# Patient Record
Sex: FEMALE | Race: WHITE | NOT HISPANIC OR LATINO
[De-identification: names, ages, dates, MRNs, and addresses within clinical notes are randomized per-mention and may not be internally consistent; named-entity substitution may affect disease eponyms.]

---

## 2018-09-20 ENCOUNTER — TRANSCRIBE ORDERS (OUTPATIENT)
Dept: SCHEDULING | Age: 63
End: 2018-09-20

## 2018-09-20 DIAGNOSIS — M79.643 PAIN OF HAND: Primary | ICD-10-CM

## 2018-09-20 DIAGNOSIS — M05.9 RHEUMATOID ARTHRITIS WITH RHEUMATOID FACTOR (CMS/HCC): ICD-10-CM

## 2018-09-20 DIAGNOSIS — Z79.899 OTHER LONG TERM (CURRENT) DRUG THERAPY: ICD-10-CM

## 2018-09-20 DIAGNOSIS — M85.80 OTHER SPECIFIED DISORDERS OF BONE DENSITY AND STRUCTURE, UNSPECIFIED SITE (CODE): ICD-10-CM

## 2018-09-20 DIAGNOSIS — Z13.89 ENCOUNTER FOR SCREENING FOR OTHER DISORDER: ICD-10-CM

## 2018-09-24 ENCOUNTER — TELEPHONE (OUTPATIENT)
Dept: PRIMARY CARE | Facility: CLINIC | Age: 63
End: 2018-09-24

## 2018-09-24 ENCOUNTER — HOSPITAL ENCOUNTER (OUTPATIENT)
Dept: RADIOLOGY | Age: 63
Discharge: HOME | End: 2018-09-24
Attending: INTERNAL MEDICINE
Payer: COMMERCIAL

## 2018-09-24 DIAGNOSIS — M79.643 PAIN OF HAND: ICD-10-CM

## 2018-09-24 DIAGNOSIS — Z13.89 ENCOUNTER FOR SCREENING FOR OTHER DISORDER: ICD-10-CM

## 2018-09-24 DIAGNOSIS — M85.80 OTHER SPECIFIED DISORDERS OF BONE DENSITY AND STRUCTURE, UNSPECIFIED SITE (CODE): ICD-10-CM

## 2018-09-24 DIAGNOSIS — Z79.899 OTHER LONG TERM (CURRENT) DRUG THERAPY: ICD-10-CM

## 2018-09-24 DIAGNOSIS — M05.9 RHEUMATOID ARTHRITIS WITH RHEUMATOID FACTOR (CMS/HCC): ICD-10-CM

## 2018-09-24 PROCEDURE — 77080 DXA BONE DENSITY AXIAL: CPT

## 2018-09-24 NOTE — TELEPHONE ENCOUNTER
Referral submitted in navinet for dexa scan [id number] at [facility]. Ref# [id number]  Expires 9/24/19

## 2018-11-11 ENCOUNTER — APPOINTMENT (OUTPATIENT)
Dept: RADIOLOGY | Age: 63
End: 2018-11-11
Attending: INTERNAL MEDICINE
Payer: COMMERCIAL

## 2018-11-11 ENCOUNTER — HOSPITAL ENCOUNTER (OUTPATIENT)
Facility: CLINIC | Age: 63
Discharge: HOME | End: 2018-11-11
Attending: INTERNAL MEDICINE
Payer: COMMERCIAL

## 2018-11-11 VITALS
OXYGEN SATURATION: 98 % | SYSTOLIC BLOOD PRESSURE: 116 MMHG | TEMPERATURE: 98.3 F | DIASTOLIC BLOOD PRESSURE: 74 MMHG | WEIGHT: 130 LBS | HEIGHT: 64 IN | BODY MASS INDEX: 22.2 KG/M2 | RESPIRATION RATE: 18 BRPM | HEART RATE: 81 BPM

## 2018-11-11 DIAGNOSIS — J45.40 ASTHMATIC BRONCHITIS, MODERATE PERSISTENT, UNCOMPLICATED: Primary | ICD-10-CM

## 2018-11-11 PROCEDURE — S9083 URGENT CARE CENTER GLOBAL: HCPCS | Performed by: INTERNAL MEDICINE

## 2018-11-11 PROCEDURE — 71046 X-RAY EXAM CHEST 2 VIEWS: CPT | Performed by: INTERNAL MEDICINE

## 2018-11-11 PROCEDURE — 99213 OFFICE O/P EST LOW 20 MIN: CPT | Performed by: INTERNAL MEDICINE

## 2018-11-11 RX ORDER — CEFUROXIME AXETIL 500 MG/1
500 TABLET ORAL
COMMUNITY
Start: 2018-02-05 | End: 2019-03-28

## 2018-11-11 RX ORDER — FOLIC ACID 1 MG/1
1 TABLET ORAL
COMMUNITY
Start: 2012-02-01

## 2018-11-11 RX ORDER — AZITHROMYCIN 250 MG/1
250 TABLET, FILM COATED ORAL DAILY
Qty: 6 TABLET | Refills: 0 | Status: SHIPPED | OUTPATIENT
Start: 2018-11-11 | End: 2019-03-28

## 2018-11-11 RX ORDER — PREDNISONE 20 MG/1
20 TABLET ORAL 2 TIMES DAILY
Qty: 10 TABLET | Refills: 0 | Status: SHIPPED | OUTPATIENT
Start: 2018-11-11 | End: 2019-03-28

## 2018-11-11 RX ORDER — ALBUTEROL SULFATE 0.83 MG/ML
2.5 SOLUTION RESPIRATORY (INHALATION) EVERY 6 HOURS PRN
Status: DISCONTINUED | OUTPATIENT
Start: 2018-11-11 | End: 2018-11-11 | Stop reason: HOSPADM

## 2018-11-11 RX ORDER — PREDNISONE 5 MG/1
20 TABLET ORAL ONCE
Status: COMPLETED | OUTPATIENT
Start: 2018-11-11 | End: 2018-11-11

## 2018-11-11 RX ORDER — MAGNESIUM 250 MG
250 TABLET ORAL
COMMUNITY
Start: 2012-02-01

## 2018-11-11 RX ORDER — PSYLLIUM HUSK 0.4 G
CAPSULE ORAL
COMMUNITY
End: 2019-03-28

## 2018-11-11 RX ADMIN — ALBUTEROL SULFATE 2.5 MG: 0.83 SOLUTION RESPIRATORY (INHALATION) at 11:38

## 2018-11-11 RX ADMIN — PREDNISONE 20 MG: 5 TABLET ORAL at 11:44

## 2018-11-11 ASSESSMENT — ENCOUNTER SYMPTOMS
SHORTNESS OF BREATH: 0
COUGH: 1
WHEEZING: 1
DIARRHEA: 0
FEVER: 0
VOMITING: 0
HEADACHES: 1
RHINORRHEA: 0
BACK PAIN: 0
NAUSEA: 0
SORE THROAT: 0
SINUS CONGESTION: 0
CHILLS: 0

## 2018-11-11 NOTE — ED PROVIDER NOTES
History  Chief Complaint   Patient presents with   • Cough     12 days      NKDA          History provided by:  Patient   used: No    Cough   Cough characteristics:  Productive  Sputum characteristics:  Green and white  Onset quality:  Sudden  Duration:  12 days  Chronicity:  New  Smoker: no    Relieved by:  Cough suppressants  Associated symptoms: headaches and wheezing    Associated symptoms: no chills, no ear pain, no fever, no rash, no rhinorrhea, no shortness of breath, no sinus congestion and no sore throat    Risk factors: recent travel        No past medical history on file.    No past surgical history on file.    No family history on file.    Social History   Substance Use Topics   • Smoking status: Not on file   • Smokeless tobacco: Not on file   • Alcohol use Not on file       Review of Systems   Constitutional: Negative for chills and fever.   HENT: Negative for ear pain, rhinorrhea and sore throat.    Respiratory: Positive for cough and wheezing. Negative for shortness of breath.    Cardiovascular: Negative for leg swelling.   Gastrointestinal: Negative for diarrhea, nausea and vomiting.   Musculoskeletal: Negative for back pain.   Skin: Negative for rash.   Neurological: Positive for headaches.       Physical Exam  ED Triage Vitals [11/11/18 1113]   Temp Heart Rate Resp BP SpO2   36.8 °C (98.3 °F) 81 18 116/74 98 %      Temp src Heart Rate Source Patient Position BP Location FiO2 (%) (Set)   -- -- Sitting Left upper arm --       Physical Exam   Constitutional: She is oriented to person, place, and time. She appears well-nourished. No distress.   cough   HENT:   Mouth/Throat: Oropharynx is clear and moist. No oropharyngeal exudate.   Eyes: Right eye exhibits no discharge. Left eye exhibits no discharge.   Cardiovascular: Normal rate and regular rhythm.    Pulmonary/Chest: She has wheezes.   Musculoskeletal: She exhibits no edema.   Lymphadenopathy:     She has no cervical adenopathy.    Neurological: She is alert and oriented to person, place, and time.   Skin: Skin is warm and dry. Capillary refill takes less than 2 seconds. She is not diaphoretic.   Psychiatric: She has a normal mood and affect. Her behavior is normal.   Vitals reviewed.        Procedures  Procedures    UC Course  Clinical Impressions as of Nov 11 1233   Asthmatic bronchitis, moderate persistent, uncomplicated       MDM  Number of Diagnoses or Management Options  Asthmatic bronchitis, moderate persistent, uncomplicated:   Diagnosis management comments: Chest x-ray was done which was negative today.  Significant reactive airways on exam.  Patient was treated with bronchodilators and prednisone.  Patient does have some immune suppression from biological is related to her rheumatoid arthritis.  Cover with antibiotics.  Continue 5 days of prednisone along with as needed bronchodilators in the form of pro-air.  Patient to follow-up with her primary care doctor if not significantly improving this week                 Marcio Moss DO  11/11/18 1236

## 2018-11-11 NOTE — DISCHARGE INSTRUCTIONS
In the office you were giving 2 albuterol breathing treatments which did result in good improvement of your airways.  You also start on prednisone and given a 40 mg dose.  Please start the prednisone prescription tonight and continue twice daily until finished.  Please use the pro-air recipe click inhaler 2 inhalations every 4-6 hours as demonstrated and needed.  Please remember, this will take 10-15 minutes to exert an effect.  Please begin the azithromycin today as well.  Lastly, some plain generic Mucinex the [facility] brand is fine, may be helpful as well to break up the mucus.  Take with a full glass of water.  Follow-up with your primary care physician later this week if symptoms are not significantly improving and on the way to resolution.

## 2019-03-28 ENCOUNTER — OFFICE VISIT (OUTPATIENT)
Dept: PRIMARY CARE | Facility: CLINIC | Age: 64
End: 2019-03-28
Payer: COMMERCIAL

## 2019-03-28 VITALS
SYSTOLIC BLOOD PRESSURE: 119 MMHG | RESPIRATION RATE: 14 BRPM | WEIGHT: 137.8 LBS | OXYGEN SATURATION: 98 % | HEART RATE: 87 BPM | BODY MASS INDEX: 23.52 KG/M2 | TEMPERATURE: 98.5 F | HEIGHT: 64 IN | DIASTOLIC BLOOD PRESSURE: 68 MMHG
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
## 2019-03-28 DIAGNOSIS — H00.015 HORDEOLUM EXTERNUM OF LEFT LOWER EYELID: Primary | ICD-10-CM

## 2019-03-28 DIAGNOSIS — G47.33 OSA (OBSTRUCTIVE SLEEP APNEA): ICD-10-CM

## 2019-03-28 PROBLEM — C44.91 BASAL CELL ADENOCARCINOMA: Status: ACTIVE | Noted: 2019-03-28

## 2019-03-28 PROCEDURE — 99214 OFFICE O/P EST MOD 30 MIN: CPT | Performed by: INTERNAL MEDICINE

## 2019-03-28 RX ORDER — TOBRAMYCIN AND DEXAMETHASONE 3; 1 MG/ML; MG/ML
SUSPENSION/ DROPS OPHTHALMIC
Qty: 5 ML | Refills: 1 | Status: SHIPPED | OUTPATIENT
Start: 2019-03-28

## 2019-03-28 ASSESSMENT — ENCOUNTER SYMPTOMS
SORE THROAT: 0
DYSPHORIC MOOD: 0
DECREASED CONCENTRATION: 0
HEADACHES: 0
LIGHT-HEADEDNESS: 0
SHORTNESS OF BREATH: 0
FEVER: 0
SINUS PRESSURE: 0
FATIGUE: 0
NERVOUS/ANXIOUS: 0
WEAKNESS: 0

## 2019-03-28 NOTE — PATIENT INSTRUCTIONS
Use eye drops for stye. One drop 3 x a day for 5 days, warm compresses.     The are on the next is an insect bite. Use steroid cream.   To have sleep study.

## 2019-03-28 NOTE — PROGRESS NOTES
Subjective      Patient ID: Allison Hilario is a 63 y.o. female.    HPI  Uses mouth guard for snoring, made by dentist, At dentist recently who suggested pt get sleep study.  Pt thinks mouth piece is helping to reduce snoring. Has some daytime fatigue. Mother and sister with LYN. .     Has a red area at lower eyelid. Mildly tender , no discharge.   Had teeth cleaning 8 days ago, noted lump 4days ago at left side of neck. Pt concerned is a lymph node.is mildly itchy. . No pain in mouth.   No sore throat.     +RA, under control. On MTX and Embrel. Water aerobics really help.   +Hx of breast cancer, rt Sept 2003, lumpectomy, RT, tamoxifen, Arimidex.   +Dyshydrotic eczema. Saw derm today. No abn lesion.     The following have been reviewed and updated as appropriate in this visit:       Review of Systems   Constitutional: Negative for fatigue and fever.   HENT: Negative for congestion, sinus pressure and sore throat.    Eyes: Negative for visual disturbance.        See hpi   Respiratory: Negative for shortness of breath.    Cardiovascular: Negative for chest pain.   Skin: Positive for rash.   Allergic/Immunologic: Positive for immunocompromised state.   Neurological: Negative for weakness, light-headedness and headaches.   Psychiatric/Behavioral: Negative for decreased concentration and dysphoric mood. The patient is not nervous/anxious.      Current Outpatient Prescriptions   Medication Sig Dispense Refill   • calcium carbonate-vitamin D3 (CALTRATE 600 + D) 600 mg (1,500 mg)-800 unit tablet,chewable one daily     • etanercept (ENBREL) 25 mg/0.5mL (0.51) injection Inject 25 mg under the skin.     • folic acid (FOLVITE) 1 mg tablet 1 mg.     • magnesium 250 mg tablet 250 mg.     • methotrexate sodium (RHEUMATREX) 2.5 mg tablets,dose pack Take 7.5 mg by mouth once a week.      • tobramycin-dexamethasone (TOBRADEX) ophthalmic solution One drop to left eye 3 x a day for 5 days 5 mL 1     No current facility-administered  medications for this visit.      Past Medical History:   Diagnosis Date   • Arthritis    • Breast cancer (CMS/HCC) (HCC)      Family History   Problem Relation Age of Onset   • Brain cancer Mother    • Hypertension Mother    • Heart failure Father    • Diabetes Father    • Breast cancer Sister    • Melanoma Brother      Allergies   Allergen Reactions   • Penicillins Other (see comments)     Unknown      Past Surgical History:   Procedure Laterality Date   • MASTECTOMY Right      Social History     Social History   • Marital status:      Spouse name: N/A   • Number of children: N/A   • Years of education: N/A     Occupational History   • Not on file.     Social History Main Topics   • Smoking status: Never Smoker   • Smokeless tobacco: Never Used   • Alcohol use No   • Drug use: No   • Sexual activity: Not on file     Other Topics Concern   • Not on file     Social History Narrative   • No narrative on file       Objective     Physical Exam   Constitutional: She appears well-developed and well-nourished. No distress.   HENT:   Cannot visualize distal tip of uvula   Eyes:   Erythematous hordeolum  at left lower inner eyelid    Cardiovascular: Normal rate and regular rhythm.    Pulmonary/Chest: Effort normal and breath sounds normal.   Lymphadenopathy:        Head (right side): No submental and no submandibular adenopathy present.        Head (left side): No submental and no submandibular adenopathy present.     She has no cervical adenopathy.        Right cervical: No superficial cervical, no deep cervical and no posterior cervical adenopathy present.       Left cervical: No superficial cervical, no deep cervical and no posterior cervical adenopathy present.        Right: No supraclavicular adenopathy present.        Left: No supraclavicular adenopathy present.   Skin:   2 cm superficial, epidermal, erythematous lesion at left neck.  Consistent with insect bite.  No adenopathy noted.   Psychiatric: She has a  normal mood and affect.   Nursing note and vitals reviewed.      Assessment/Plan   Problem List Items Addressed This Visit     None      Visit Diagnoses     Hordeolum externum of left lower eyelid    -  Primary    Relevant Medications    tobramycin-dexamethasone (TOBRADEX) ophthalmic solution    Insect bite, initial encounter        LYN (obstructive sleep apnea)        Relevant Orders    Ambulatory referral to Sleep Medicine      Above discussed.  TobraDex ophthalmic solution to left eye.  Insect bite at left neck, no lymph nodes are seen.  Use over-the-counter topical steroids for this.  Likely has obstructive sleep apnea, sent for sleep study.    Moe Lopez MD  3/28/2019

## 2019-04-05 ENCOUNTER — TRANSCRIBE ORDERS (OUTPATIENT)
Dept: SCHEDULING | Age: 64
End: 2019-04-05

## 2019-04-05 DIAGNOSIS — G47.33 OBSTRUCTIVE SLEEP APNEA: Primary | ICD-10-CM

## 2019-05-02 ENCOUNTER — HOSPITAL ENCOUNTER (OUTPATIENT)
Dept: SLEEP MEDICINE | Facility: HOSPITAL | Age: 64
Discharge: HOME | End: 2019-05-02
Attending: INTERNAL MEDICINE
Payer: COMMERCIAL

## 2019-05-02 DIAGNOSIS — G47.33 OBSTRUCTIVE SLEEP APNEA: ICD-10-CM

## 2019-05-02 PROCEDURE — G0399 HOME SLEEP TEST/TYPE 3 PORTA: HCPCS

## 2019-05-13 ENCOUNTER — OFFICE VISIT (OUTPATIENT)
Dept: PRIMARY CARE | Facility: CLINIC | Age: 64
End: 2019-05-13
Payer: COMMERCIAL

## 2019-05-13 VITALS
WEIGHT: 138.4 LBS | OXYGEN SATURATION: 97 % | DIASTOLIC BLOOD PRESSURE: 68 MMHG | TEMPERATURE: 97.7 F | HEART RATE: 101 BPM | BODY MASS INDEX: 23.63 KG/M2 | SYSTOLIC BLOOD PRESSURE: 140 MMHG | HEIGHT: 64 IN | RESPIRATION RATE: 14 BRPM

## 2019-05-13 DIAGNOSIS — R10.2 PELVIC PRESSURE IN FEMALE: Primary | ICD-10-CM

## 2019-05-13 LAB
BILIRUBIN, POC: NEGATIVE
BLOOD URINE, POC: NEGATIVE
CLARITY, POC: CLEAR
COLOR, POC: YELLOW
GLUCOSE URINE, POC: NEGATIVE
KETONES, POC: NEGATIVE
LEUKOCYTE EST, POC: NEGATIVE
NITRITE, POC: NEGATIVE
PH, POC: 6
PROTEIN, POC: NEGATIVE
SPECIFIC GRAVITY, POC: 1.01
UROBILINOGEN, POC: 0.2

## 2019-05-13 PROCEDURE — 99213 OFFICE O/P EST LOW 20 MIN: CPT | Performed by: INTERNAL MEDICINE

## 2019-05-13 PROCEDURE — 81002 URINALYSIS NONAUTO W/O SCOPE: CPT | Performed by: INTERNAL MEDICINE

## 2019-05-13 ASSESSMENT — ENCOUNTER SYMPTOMS
CONSTIPATION: 0
DIARRHEA: 0
FATIGUE: 0

## 2019-05-13 NOTE — PROGRESS NOTES
Subjective      Patient ID: Allison Hilario is a 64 y.o. female.    HPI  Pressure feeling in pelvis for a few weeks.   No dysuria.   +frequency, is drnking lots of liquids.   BMs fine.  Last gyn visit 2 years ago, normal.   Caffiene, tea, 2 bottles a day     Hx breast cancer.   RA on MTX and Enbrel. .     The following have been reviewed and updated as appropriate in this visit:  Allergies  Meds  Problems       Review of Systems   Constitutional: Negative for fatigue.   Gastrointestinal: Negative for constipation and diarrhea.   Genitourinary:        See hpi     Current Outpatient Prescriptions   Medication Sig Dispense Refill   • calcium carbonate-vitamin D3 (CALTRATE 600 + D) 600 mg (1,500 mg)-800 unit tablet,chewable one daily     • etanercept (ENBREL) 25 mg/0.5mL (0.51) injection Inject 25 mg under the skin.     • folic acid (FOLVITE) 1 mg tablet 1 mg.     • magnesium 250 mg tablet 250 mg.     • methotrexate sodium (RHEUMATREX) 2.5 mg tablets,dose pack Take 7.5 mg by mouth once a week.      • tobramycin-dexamethasone (TOBRADEX) ophthalmic solution One drop to left eye 3 x a day for 5 days 5 mL 1     No current facility-administered medications for this visit.      Past Medical History:   Diagnosis Date   • Arthritis    • Breast cancer (CMS/HCC) (HCC)      Family History   Problem Relation Age of Onset   • Brain cancer Mother    • Hypertension Mother    • Heart failure Father    • Diabetes Father    • Breast cancer Sister    • Melanoma Brother      Allergies   Allergen Reactions   • Penicillins Other (see comments)     Unknown      Past Surgical History:   Procedure Laterality Date   • MASTECTOMY Right      Social History     Social History   • Marital status:      Spouse name: N/A   • Number of children: N/A   • Years of education: N/A     Social History Main Topics   • Smoking status: Never Smoker   • Smokeless tobacco: Never Used   • Alcohol use No   • Drug use: No   • Sexual activity: Not Asked  "    Other Topics Concern   • None     Social History Narrative   • None       Objective     Vitals:   Vitals:    05/13/19 1312   BP: 140/68   BP Location: Left upper arm   Patient Position: Sitting   Pulse: (!) 101   Resp: 14   Temp: 36.5 °C (97.7 °F)   SpO2: 97%   Weight: 62.8 kg (138 lb 6.4 oz)   Height: 1.626 m (5' 4\")       Physical Exam   Abdominal: Soft. Bowel sounds are normal. She exhibits no distension. There is no tenderness.   Genitourinary:   Genitourinary Comments: No cva tenderness. Mild suprapubic tenderness   Nursing note and vitals reviewed.      Labs  No results found for: WBC, HGB, HCT, PLT, CHOL, TRIG, HDL, LDLCALC, ALT, AST, NA, K, GLU, CL, CREATININE, BUN, CO2, TSH    Assessment/Plan   Problem List Items Addressed This Visit     None      Visit Diagnoses     Pelvic pressure in female    -  Primary    Relevant Orders    US PELVIS TRANSABDOMINAL & TRANSVAGINAL    POCT urinalysis dipstick (Completed)        Pelvic pressure with some urinary issues for the past 2 weeks, urine dip is negative here.  Check ultrasound of pelvis.  Advised to return to normal liquid consumption and cut down on caffeine,.  Moe Lopez MD  5/13/2019    "

## 2019-05-14 ENCOUNTER — TELEPHONE (OUTPATIENT)
Dept: PRIMARY CARE | Facility: CLINIC | Age: 64
End: 2019-05-14

## 2019-05-14 ENCOUNTER — HOSPITAL ENCOUNTER (OUTPATIENT)
Dept: RADIOLOGY | Age: 64
Discharge: HOME | End: 2019-05-14
Attending: INTERNAL MEDICINE
Payer: COMMERCIAL

## 2019-05-14 DIAGNOSIS — R10.2 PELVIC PRESSURE IN FEMALE: ICD-10-CM

## 2019-05-14 PROCEDURE — 76856 US EXAM PELVIC COMPLETE: CPT | Mod: 59

## 2019-05-15 ENCOUNTER — TELEPHONE (OUTPATIENT)
Dept: PRIMARY CARE | Facility: CLINIC | Age: 64
End: 2019-05-15

## 2019-05-15 NOTE — TELEPHONE ENCOUNTER
----- Message from Moe Lopez MD sent at 5/15/2019  9:06 AM EDT -----  Ultrasound of pelvis shows nothing worrisome.   Has mild irregularity of muscle of uterus due to mucus glands in the muscle (adenomyosis). Radiologist says is unlikely to cause any symptoms (will not cause her pelvic pressure)   The lining of the uterus is normal.   The ovaries are fine.   How is the urination? I asked her to cut down on caffeine and not to drink excess fluid.   (urine dip in office normal)

## 2019-11-13 ENCOUNTER — TELEPHONE (OUTPATIENT)
Dept: PRIMARY CARE | Facility: CLINIC | Age: 64
End: 2019-11-13

## 2019-11-13 RX ORDER — SCOPOLAMINE 1 MG/3D
1 PATCH, EXTENDED RELEASE TRANSDERMAL
Qty: 4 PATCH | Refills: 0 | Status: SHIPPED | OUTPATIENT
Start: 2019-11-13 | End: 2020-02-12

## 2020-02-12 ENCOUNTER — OFFICE VISIT (OUTPATIENT)
Dept: PRIMARY CARE | Facility: CLINIC | Age: 65
End: 2020-02-12
Payer: COMMERCIAL

## 2020-02-12 VITALS
BODY MASS INDEX: 23.56 KG/M2 | HEIGHT: 64 IN | DIASTOLIC BLOOD PRESSURE: 66 MMHG | RESPIRATION RATE: 14 BRPM | SYSTOLIC BLOOD PRESSURE: 138 MMHG | OXYGEN SATURATION: 97 % | HEART RATE: 98 BPM | TEMPERATURE: 98.9 F | WEIGHT: 138 LBS

## 2020-02-12 DIAGNOSIS — D84.9 IMMUNOSUPPRESSED STATUS (CMS/HCC): ICD-10-CM

## 2020-02-12 DIAGNOSIS — J13 PNEUMONIA OF RIGHT LOWER LOBE DUE TO STREPTOCOCCUS PNEUMONIAE (CMS/HCC): Primary | ICD-10-CM

## 2020-02-12 PROCEDURE — 99214 OFFICE O/P EST MOD 30 MIN: CPT | Performed by: INTERNAL MEDICINE

## 2020-02-12 RX ORDER — AZITHROMYCIN 250 MG/1
TABLET, FILM COATED ORAL
Qty: 6 TABLET | Refills: 0 | Status: SHIPPED | OUTPATIENT
Start: 2020-02-12 | End: 2020-02-17

## 2020-02-12 ASSESSMENT — ENCOUNTER SYMPTOMS
MYALGIAS: 1
DIFFICULTY URINATING: 0
SINUS PAIN: 0
SORE THROAT: 1
HEADACHES: 1
FEVER: 1
SHORTNESS OF BREATH: 0
PALPITATIONS: 0
FATIGUE: 1
COUGH: 1

## 2020-02-12 NOTE — PATIENT INSTRUCTIONS
You have pneumonia.   Zpack.   Use inhaler, 2 puffs 3 x a day as needed for cough and to loosen phlegm.   Mucinex 600mg 2 x a day  To loosen phlegm.   Rest, fluids.

## 2020-08-18 ENCOUNTER — DOCUMENTATION (OUTPATIENT)
Dept: PRIMARY CARE | Facility: CLINIC | Age: 65
End: 2020-08-18

## 2020-10-15 ENCOUNTER — PATIENT OUTREACH (OUTPATIENT)
Dept: PRIMARY CARE | Facility: CLINIC | Age: 65
End: 2020-10-15

## 2020-10-15 NOTE — PROGRESS NOTES
Care Gap Team has outreached to Allison Hilario on behalf of their primary care provider.      Care Gap Source:: [facility]         Care Gap Status:: Due    Outreach via:: Telephone         Chart Review Completed:: Yes  Patient interview completed:: Yes  Inclusion Criteria:: Met  Exclusion Criteria: None  Patient educated on recommended care:: Yes                 Appointment provided:: No            Spoke to pt about need for cervical cancer screening. Pt states she would like to schedule with Dr. Lopez, but needs to reach out to Oncologist to find out when her appt in November is, she would like to do them the same week. Pt aware of my direct extension and will call me back to schedule.

## 2022-03-01 ENCOUNTER — APPOINTMENT (RX ONLY)
Dept: URBAN - METROPOLITAN AREA CLINIC 332 | Facility: CLINIC | Age: 67
Setting detail: DERMATOLOGY
End: 2022-03-01

## 2022-03-01 DIAGNOSIS — L57.8 OTHER SKIN CHANGES DUE TO CHRONIC EXPOSURE TO NONIONIZING RADIATION: ICD-10-CM | Status: INADEQUATELY CONTROLLED

## 2022-03-01 DIAGNOSIS — L57.0 ACTINIC KERATOSIS: ICD-10-CM | Status: INADEQUATELY CONTROLLED

## 2022-03-01 DIAGNOSIS — Z85.828 PERSONAL HISTORY OF OTHER MALIGNANT NEOPLASM OF SKIN: ICD-10-CM

## 2022-03-01 PROCEDURE — 17000 DESTRUCT PREMALG LESION: CPT

## 2022-03-01 PROCEDURE — 99213 OFFICE O/P EST LOW 20 MIN: CPT | Mod: 25

## 2022-03-01 PROCEDURE — ? COUNSELING

## 2022-03-01 PROCEDURE — 17003 DESTRUCT PREMALG LES 2-14: CPT

## 2022-03-01 PROCEDURE — ? ADDITIONAL NOTES

## 2022-03-01 PROCEDURE — ? SUNSCREEN RECOMMENDATIONS

## 2022-03-01 PROCEDURE — ? LIQUID NITROGEN

## 2022-03-01 ASSESSMENT — LOCATION ZONE DERM
LOCATION ZONE: LEG
LOCATION ZONE: FACE

## 2022-03-01 ASSESSMENT — LOCATION DETAILED DESCRIPTION DERM
LOCATION DETAILED: LEFT INFERIOR CENTRAL MALAR CHEEK
LOCATION DETAILED: LEFT DISTAL PRETIBIAL REGION
LOCATION DETAILED: LEFT LATERAL DISTAL PRETIBIAL REGION
LOCATION DETAILED: RIGHT DISTAL PRETIBIAL REGION
LOCATION DETAILED: RIGHT INFERIOR CENTRAL MALAR CHEEK
LOCATION DETAILED: LEFT CENTRAL EYEBROW
LOCATION DETAILED: LEFT CENTRAL MALAR CHEEK
LOCATION DETAILED: RIGHT CENTRAL ZYGOMA
LOCATION DETAILED: RIGHT CENTRAL MALAR CHEEK

## 2022-03-01 ASSESSMENT — LOCATION SIMPLE DESCRIPTION DERM
LOCATION SIMPLE: LEFT CHEEK
LOCATION SIMPLE: LEFT EYEBROW
LOCATION SIMPLE: RIGHT CHEEK
LOCATION SIMPLE: RIGHT ZYGOMA
LOCATION SIMPLE: RIGHT PRETIBIAL REGION
LOCATION SIMPLE: LEFT PRETIBIAL REGION

## 2023-09-27 ENCOUNTER — APPOINTMENT (RX ONLY)
Dept: URBAN - METROPOLITAN AREA CLINIC 332 | Facility: CLINIC | Age: 68
Setting detail: DERMATOLOGY
End: 2023-09-27

## 2023-09-27 DIAGNOSIS — L57.0 ACTINIC KERATOSIS: ICD-10-CM

## 2023-09-27 DIAGNOSIS — D18.0 HEMANGIOMA: ICD-10-CM

## 2023-09-27 DIAGNOSIS — L82.1 OTHER SEBORRHEIC KERATOSIS: ICD-10-CM

## 2023-09-27 DIAGNOSIS — D22 MELANOCYTIC NEVI: ICD-10-CM

## 2023-09-27 DIAGNOSIS — L71.8 OTHER ROSACEA: ICD-10-CM | Status: INADEQUATELY CONTROLLED

## 2023-09-27 DIAGNOSIS — L90.5 SCAR CONDITIONS AND FIBROSIS OF SKIN: ICD-10-CM

## 2023-09-27 DIAGNOSIS — L81.4 OTHER MELANIN HYPERPIGMENTATION: ICD-10-CM

## 2023-09-27 PROBLEM — D22.5 MELANOCYTIC NEVI OF TRUNK: Status: ACTIVE | Noted: 2023-09-27

## 2023-09-27 PROBLEM — D18.01 HEMANGIOMA OF SKIN AND SUBCUTANEOUS TISSUE: Status: ACTIVE | Noted: 2023-09-27

## 2023-09-27 PROCEDURE — ? LIQUID NITROGEN

## 2023-09-27 PROCEDURE — 17000 DESTRUCT PREMALG LESION: CPT

## 2023-09-27 PROCEDURE — ? TREATMENT REGIMEN

## 2023-09-27 PROCEDURE — ? PRESCRIPTION MEDICATION MANAGEMENT

## 2023-09-27 PROCEDURE — ? FULL BODY SKIN EXAM

## 2023-09-27 PROCEDURE — 99214 OFFICE O/P EST MOD 30 MIN: CPT | Mod: 25

## 2023-09-27 PROCEDURE — ? COUNSELING

## 2023-09-27 PROCEDURE — ? PRESCRIPTION

## 2023-09-27 RX ORDER — METRONIDAZOLE 7.5 MG/G
CREAM TOPICAL
Qty: 45 | Refills: 3 | Status: ERX | COMMUNITY
Start: 2023-09-27

## 2023-09-27 RX ADMIN — METRONIDAZOLE 1: 7.5 CREAM TOPICAL at 00:00

## 2023-09-27 ASSESSMENT — LOCATION SIMPLE DESCRIPTION DERM
LOCATION SIMPLE: NOSE
LOCATION SIMPLE: LEFT FOREHEAD
LOCATION SIMPLE: LEFT PRETIBIAL REGION
LOCATION SIMPLE: RIGHT UPPER BACK
LOCATION SIMPLE: RIGHT LOWER BACK
LOCATION SIMPLE: CHEST
LOCATION SIMPLE: LEFT UPPER BACK

## 2023-09-27 ASSESSMENT — LOCATION ZONE DERM
LOCATION ZONE: TRUNK
LOCATION ZONE: NOSE
LOCATION ZONE: LEG
LOCATION ZONE: FACE

## 2023-09-27 ASSESSMENT — LOCATION DETAILED DESCRIPTION DERM
LOCATION DETAILED: LEFT DISTAL PRETIBIAL REGION
LOCATION DETAILED: LEFT FOREHEAD
LOCATION DETAILED: RIGHT SUPERIOR MEDIAL MIDBACK
LOCATION DETAILED: UPPER STERNUM
LOCATION DETAILED: LEFT MEDIAL UPPER BACK
LOCATION DETAILED: RIGHT INFERIOR UPPER BACK
LOCATION DETAILED: NASAL DORSUM

## 2023-09-27 NOTE — PROCEDURE: PRESCRIPTION MEDICATION MANAGEMENT
Detail Level: Zone
Render In Strict Bullet Format?: No
Initiate Treatment: Todd soap - ene on [employer] \\nMetrocream bid

## 2023-09-27 NOTE — PROCEDURE: COUNSELING
Detail Level: Simple
Sunscreen Recommendations: Use a mineral sunscreen daily to sun exposed areas. Reapply every 80 minutes or after sweating or swimming.
Detail Level: Generalized
Detail Level: Zone
No

## 2023-09-27 NOTE — HPI: EVALUATION OF SKIN LESION(S)
What Type Of Note Output Would You Prefer (Optional)?: Standard Output
Hpi Title: Evaluation of Skin Lesions
Family Member: Brother and sister

## 2023-09-27 NOTE — PROCEDURE: LIQUID NITROGEN
Post-Care Instructions: I reviewed with the patient in detail post-care instructions. Patient is to wear sunprotection, and avoid picking at any of the treated lesions. Pt may apply Vaseline to crusted or scabbing areas.
Render Post-Care Instructions In Note?: no
Show Aperture Variable?: Yes
Consent: The patient's consent was obtained including but not limited to risks of crusting, scabbing, blistering, scarring, darker or lighter pigmentary change, recurrence, incomplete removal and infection.
Duration Of Freeze Thaw-Cycle (Seconds): 15
Detail Level: Detailed

## 2024-02-25 NOTE — PROGRESS NOTES
Debby Mcdermott MA has completed a chart review for Allison Hilario and have determined that the care gap has been satisfied.    Care Gap Source:: [facility]    Care Gap(s) Identified:: Colorectal Cancer Screening    Chart Review Completed:: Yes     Completed on 7/13/2012  
hide

## 2024-06-04 ENCOUNTER — APPOINTMENT (RX ONLY)
Dept: URBAN - METROPOLITAN AREA CLINIC 332 | Facility: CLINIC | Age: 69
Setting detail: DERMATOLOGY
End: 2024-06-04

## 2024-06-04 DIAGNOSIS — L71.8 OTHER ROSACEA: ICD-10-CM | Status: INADEQUATELY CONTROLLED

## 2024-06-04 DIAGNOSIS — I78.8 OTHER DISEASES OF CAPILLARIES: ICD-10-CM

## 2024-06-04 DIAGNOSIS — L57.0 ACTINIC KERATOSIS: ICD-10-CM

## 2024-06-04 PROCEDURE — ? LIQUID NITROGEN

## 2024-06-04 PROCEDURE — ? PRESCRIPTION MEDICATION MANAGEMENT

## 2024-06-04 PROCEDURE — ? PRESCRIPTION

## 2024-06-04 PROCEDURE — ? COUNSELING

## 2024-06-04 PROCEDURE — 17000 DESTRUCT PREMALG LESION: CPT

## 2024-06-04 PROCEDURE — 17003 DESTRUCT PREMALG LES 2-14: CPT

## 2024-06-04 PROCEDURE — 99214 OFFICE O/P EST MOD 30 MIN: CPT | Mod: 25

## 2024-06-04 RX ORDER — METRONIDAZOLE 7.5 MG/G
CREAM TOPICAL BID
Qty: 45 | Refills: 5

## 2024-06-04 ASSESSMENT — LOCATION DETAILED DESCRIPTION DERM
LOCATION DETAILED: NASAL SUPRATIP
LOCATION DETAILED: RIGHT INFERIOR CENTRAL MALAR CHEEK
LOCATION DETAILED: RIGHT MEDIAL MALAR CHEEK

## 2024-06-04 ASSESSMENT — LOCATION ZONE DERM
LOCATION ZONE: NOSE
LOCATION ZONE: FACE

## 2024-06-04 ASSESSMENT — LOCATION SIMPLE DESCRIPTION DERM
LOCATION SIMPLE: NOSE
LOCATION SIMPLE: RIGHT CHEEK

## 2024-06-04 NOTE — PROCEDURE: PRESCRIPTION MEDICATION MANAGEMENT
Detail Level: Zone
Plan: Continue Todd soap daily\\nStart Metronidazole cream twice a day.
Render In Strict Bullet Format?: No

## 2024-06-04 NOTE — PROCEDURE: LIQUID NITROGEN
Show Aperture Variable?: Yes
Consent: The patient's consent was obtained including but not limited to risks of crusting, scabbing, blistering, scarring, darker or lighter pigmentary change, recurrence, incomplete removal and infection.
Render Note In Bullet Format When Appropriate: No
Detail Level: Detailed
Duration Of Freeze Thaw-Cycle (Seconds): 15
Post-Care Instructions: I reviewed with the patient in detail post-care instructions. Patient is to wear sunprotection, and avoid picking at any of the treated lesions. Pt may apply Vaseline to crusted or scabbing areas.
